# Patient Record
Sex: FEMALE | Race: WHITE | URBAN - NONMETROPOLITAN AREA
[De-identification: names, ages, dates, MRNs, and addresses within clinical notes are randomized per-mention and may not be internally consistent; named-entity substitution may affect disease eponyms.]

---

## 2019-11-13 ENCOUNTER — IMPORTED ENCOUNTER (OUTPATIENT)
Dept: URBAN - NONMETROPOLITAN AREA CLINIC 1 | Facility: CLINIC | Age: 24
End: 2019-11-13

## 2019-11-13 PROBLEM — H52.221: Noted: 2019-11-13

## 2019-11-13 PROBLEM — H52.13: Noted: 2019-11-13

## 2019-11-13 PROCEDURE — S0620 ROUTINE OPHTHALMOLOGICAL EXA: HCPCS

## 2019-11-13 NOTE — PATIENT DISCUSSION
Compound Myopic Astigmatism OD/Simple Myopia OS -  discussed findings w/patient-  new spectacle Rx issued for distance use or prn -  monitor yearly or prn; 's Notes: MR 11/13/2019DFE 11/13/2019

## 2022-04-09 ASSESSMENT — TONOMETRY
OD_IOP_MMHG: 16
OS_IOP_MMHG: 16

## 2022-04-09 ASSESSMENT — VISUAL ACUITY
OD_CC: 20/30-
OS_CC: 20/30